# Patient Record
Sex: FEMALE | Race: BLACK OR AFRICAN AMERICAN | NOT HISPANIC OR LATINO | URBAN - METROPOLITAN AREA
[De-identification: names, ages, dates, MRNs, and addresses within clinical notes are randomized per-mention and may not be internally consistent; named-entity substitution may affect disease eponyms.]

---

## 2017-10-17 ENCOUNTER — GENERIC CONVERSION - ENCOUNTER (OUTPATIENT)
Dept: OTHER | Facility: OTHER | Age: 42
End: 2017-10-17

## 2018-01-12 NOTE — MISCELLANEOUS
Please contact our office at your convenience  It is important that we speak to you  REGARDING:   Porfavor contacte a nuestra oficina  Es muy  importante que hablemos con usted   SOBRE:          Scheduling an Appointment/ Programar lb [place]          Rescheduling an Appointment/ Re-programar  lb [place]     Cancelling an Appointment/Cancelar bonilla [place]     Your Lab/ X-ray Results/Resultados         Updating your Phone number or Address/ Actualizar bonilla [employer]           X Verify your Primary Providor/ Verificar bonilla Proveedor primario     Other/Otro:    Please Contact Our Office to Schedule Your Appointment  It  is Very Important That You See Your Provider for your  Routine Medical Care  If you are seeing a New Providor,  Please Contact our Office so we can update our Records  Thank You  Comuníquese con nuestra oficina para programar bonilla [place]  Es Muy Importante que usted jyoti bonilla proveedor  para bonilla   [address] [facility] Floor de [place]  Si usted esta viendo un Proveedor  [place], [place] con Neal Mesfin oficina para actualizar   nuestro registros  Mehreen